# Patient Record
(demographics unavailable — no encounter records)

---

## 2025-05-01 NOTE — PHYSICAL EXAM
[Appropriately responsive] : appropriately responsive [Alert] : alert [No Acute Distress] : no acute distress [No Lymphadenopathy] : no lymphadenopathy [Regular Rate Rhythm] : regular rate rhythm [No Murmurs] : no murmurs [Clear to Auscultation B/L] : clear to auscultation bilaterally [Soft] : soft [Non-tender] : non-tender [Non-distended] : non-distended [No HSM] : No HSM [No Lesions] : no lesions [No Mass] : no mass [Oriented x3] : oriented x3 [Examination Of The Breasts] : a normal appearance [No Masses] : no breast masses were palpable [Labia Majora] : normal [Labia Minora] : normal [No Bleeding] : There was no active vaginal bleeding [Normal] : normal [Uterine Adnexae] : normal [FreeTextEntry6] : Fullness in UOQ of breasts b/l -- pt thinks it is related to her being due for her menses soon

## 2025-05-01 NOTE — HISTORY OF PRESENT ILLNESS
[Patient reported PAP Smear was normal] : Patient reported PAP Smear was normal [Patient reported colonoscopy was abnormal] : Patient reported colonoscopy was abnormal [FreeTextEntry1] : 35yo  LMP  not on BCM is here for annual. She is interested in discussing permanent sterilization.   pt c/o pain with intercourse sometimes. May be positional but more towards the left side.  [PapSmeardate] : 2023 [ColonoscopyDate] : 2024 [TextBox_[id number]] : had an infection

## 2025-05-01 NOTE — DISCUSSION/SUMMARY
[FreeTextEntry1] : 33yo  LMP 4/4 not on BCM desiring BS.   BS:  - RBA reviewed, Pt certain she wants it  - task sent to Lindsey for scheduling   Dyspareunia:  - TVUS and MD visit   Fullness in UOQ of breasts b/l:  - Pt due for menses soon  - RTO 2 wks post menses for repeat exam   RHM:  - Pap/HPV sent  - Dentist, PCP, Derm as discussed  - Colonoscopy as discussed -- pt due per GI.  Discussed importance of following up for colon ca screening. She verbalized understanding.  - Offered STI screen today. Pt declined - Encouraged healthy diet, exercise, weight maint.    I spent 20 min in taking history, examining, consulting and writing the note for this patient.  Cely Jordan MD  Obstetrician/Gynecologist

## 2025-05-23 NOTE — PHYSICAL EXAM
[FreeTextEntry1] : episiotomy scar to R of midline well healed. It is tense and likely has no "given" with engorgement and is uncomfortable

## 2025-05-23 NOTE — HISTORY OF PRESENT ILLNESS
[FreeTextEntry1] : 35yo  LMP  is here for results. She had TVUS for dyspareunia. Eight cm uterus, 9mm EMS, normal cervix and ovaries b/l.

## 2025-05-23 NOTE — DISCUSSION/SUMMARY
[FreeTextEntry1] : 33yo  LMP  is here for results. No e/o any reason for dyspareunia on sono. Will continue to track and follow. Pt desires BS and scheduled for Lap BS on 7/3. Wanted to know if her episiotomy scar could be revised. Discussed risk of pain and more scar tissue making issue worse. recommended trying vitamin E massage to area and lube with intercourse.   We discussed the R/B/A of the procedure including but not limited to bleeding, infection incomplete/need for repeat/need for further procedure, injury to surrounding organs (bowel, bladder, uterus, tubes, ovaries, cervix), regret, risk associated with anesthesia. Pt to obtain pre-operative surgical clearance from PST as well. She is aware that if she misses this appointment the surgery will be cancelled. We discussed post op care, expectations and precautions as well. All questions were solicited and answered.   Consents signed and scanned in today    I spent 20 min in taking history, examining, consulting and writing the note for this patient.  Cely Jordan MD  Obstetrician/Gynecologist

## 2025-07-25 NOTE — PLAN
[FreeTextEntry1] : Pt doing well postop, incisions healed, reports some discomfort at umb site but looks healed without any erythema or drainage.  Reviewed can use Neosoporin.   f/u annual    -

## 2025-07-25 NOTE — HISTORY OF PRESENT ILLNESS
[Pain is well-controlled] : pain is well-controlled [Clean/Dry/Intact] : clean, dry and intact [Healed] : healed [Pathology reviewed] : pathology reviewed [Swelling] : not swollen [Dehiscence] : not dehisced [Discharge] : absent of discharge